# Patient Record
Sex: FEMALE | Race: WHITE | Employment: UNEMPLOYED | URBAN - NONMETROPOLITAN AREA
[De-identification: names, ages, dates, MRNs, and addresses within clinical notes are randomized per-mention and may not be internally consistent; named-entity substitution may affect disease eponyms.]

---

## 2017-08-19 ENCOUNTER — APPOINTMENT (OUTPATIENT)
Dept: GENERAL RADIOLOGY | Age: 52
End: 2017-08-19
Payer: MEDICARE

## 2017-08-19 ENCOUNTER — HOSPITAL ENCOUNTER (EMERGENCY)
Age: 52
Discharge: HOME | End: 2017-08-19
Payer: MEDICARE

## 2017-08-19 VITALS
TEMPERATURE: 97.8 F | RESPIRATION RATE: 20 BRPM | BODY MASS INDEX: 16.83 KG/M2 | DIASTOLIC BLOOD PRESSURE: 94 MMHG | HEART RATE: 83 BPM | WEIGHT: 95 LBS | OXYGEN SATURATION: 100 % | HEIGHT: 63 IN | SYSTOLIC BLOOD PRESSURE: 116 MMHG

## 2017-08-19 DIAGNOSIS — M25.511 ACUTE PAIN OF RIGHT SHOULDER: ICD-10-CM

## 2017-08-19 DIAGNOSIS — T14.8XXA CONTUSION OF CLAVICLE, RIGHT, INITIAL ENCOUNTER: Primary | ICD-10-CM

## 2017-08-19 DIAGNOSIS — S40.211A SHOULDER ABRASION, RIGHT, INITIAL ENCOUNTER: ICD-10-CM

## 2017-08-19 PROCEDURE — 99283 EMERGENCY DEPT VISIT LOW MDM: CPT

## 2017-08-19 PROCEDURE — A4565 SLINGS: HCPCS

## 2017-08-19 PROCEDURE — 73000 X-RAY EXAM OF COLLAR BONE: CPT

## 2017-08-19 PROCEDURE — 6370000000 HC RX 637 (ALT 250 FOR IP): Performed by: PHYSICIAN ASSISTANT

## 2017-08-19 RX ORDER — HYDROCODONE BITARTRATE AND ACETAMINOPHEN 5; 325 MG/1; MG/1
1 TABLET ORAL ONCE
Status: COMPLETED | OUTPATIENT
Start: 2017-08-19 | End: 2017-08-19

## 2017-08-19 RX ORDER — NAPROXEN 500 MG/1
500 TABLET ORAL 2 TIMES DAILY
Qty: 60 TABLET | Refills: 0 | Status: SHIPPED | OUTPATIENT
Start: 2017-08-19 | End: 2019-06-26 | Stop reason: ALTCHOICE

## 2017-08-19 RX ORDER — HYDROCODONE BITARTRATE AND ACETAMINOPHEN 5; 325 MG/1; MG/1
1 TABLET ORAL EVERY 6 HOURS PRN
Qty: 12 TABLET | Refills: 0 | Status: SHIPPED | OUTPATIENT
Start: 2017-08-19 | End: 2019-06-26 | Stop reason: ALTCHOICE

## 2017-08-19 RX ADMIN — HYDROCODONE BITARTRATE AND ACETAMINOPHEN 1 TABLET: 5; 325 TABLET ORAL at 11:32

## 2017-08-19 ASSESSMENT — ENCOUNTER SYMPTOMS
RHINORRHEA: 0
NAUSEA: 0
WHEEZING: 0
ABDOMINAL PAIN: 0
SORE THROAT: 0
COUGH: 0
EYE PAIN: 0
BACK PAIN: 0
SHORTNESS OF BREATH: 0
DIARRHEA: 0
EYE DISCHARGE: 0
VOMITING: 0

## 2017-08-19 ASSESSMENT — PAIN SCALES - GENERAL
PAINLEVEL_OUTOF10: 10
PAINLEVEL_OUTOF10: 10

## 2017-08-19 ASSESSMENT — PAIN DESCRIPTION - PROGRESSION: CLINICAL_PROGRESSION: GRADUALLY WORSENING

## 2017-08-19 ASSESSMENT — PAIN DESCRIPTION - PAIN TYPE: TYPE: ACUTE PAIN

## 2017-08-19 ASSESSMENT — PAIN DESCRIPTION - ORIENTATION: ORIENTATION: RIGHT

## 2017-08-19 ASSESSMENT — PAIN DESCRIPTION - LOCATION: LOCATION: SHOULDER

## 2017-08-19 ASSESSMENT — PAIN DESCRIPTION - DESCRIPTORS: DESCRIPTORS: ACHING;SHARP

## 2018-10-29 ENCOUNTER — HOSPITAL ENCOUNTER (OUTPATIENT)
Age: 53
Discharge: HOME | End: 2018-10-29
Payer: MEDICARE

## 2018-10-29 ENCOUNTER — HOSPITAL ENCOUNTER (OUTPATIENT)
Dept: GENERAL RADIOLOGY | Age: 53
Discharge: HOME | End: 2018-10-29
Payer: MEDICARE

## 2018-10-29 DIAGNOSIS — J20.9 ACUTE BRONCHITIS, UNSPECIFIED ORGANISM: ICD-10-CM

## 2018-10-29 PROCEDURE — 71046 X-RAY EXAM CHEST 2 VIEWS: CPT

## 2019-06-05 ENCOUNTER — HOSPITAL ENCOUNTER (OUTPATIENT)
Dept: MRI IMAGING | Age: 54
Discharge: HOME | End: 2019-06-05
Payer: MEDICARE

## 2019-06-05 DIAGNOSIS — M54.16 LUMBAR RADICULOPATHY: ICD-10-CM

## 2019-06-05 PROCEDURE — 72148 MRI LUMBAR SPINE W/O DYE: CPT

## 2019-06-17 ENCOUNTER — HOSPITAL ENCOUNTER (OUTPATIENT)
Dept: INTERVENTIONAL RADIOLOGY/VASCULAR | Age: 54
Discharge: HOME | End: 2019-06-17
Payer: MEDICARE

## 2019-06-17 DIAGNOSIS — I73.9 PVD (PERIPHERAL VASCULAR DISEASE) (HCC): ICD-10-CM

## 2019-06-17 PROCEDURE — 93923 UPR/LXTR ART STDY 3+ LVLS: CPT

## 2019-06-26 RX ORDER — ACETAMINOPHEN,DIPHENHYDRAMINE HCL 500; 25 MG/1; MG/1
2 TABLET, FILM COATED ORAL NIGHTLY PRN
COMMUNITY

## 2019-06-26 NOTE — PROGRESS NOTES
NPO after midnight  [employer] and drivers license  Wear comfortable clean clothing  Do not bring jewelry  Shower night before and morning of surgery with a liquid antibacterial soap  Bring list of medications with dosage and how often taken  Follow all instructions given by your physician   needed at discharge  Call [phone] for any questions    In preparation for their surgical procedure above patient was screened for Obstructive Sleep Apnea (ROHAN) using the STOP-Bang Questionnaire by the Pre-Admission Testing department. This is a pre-surgical screening tool for patient safety and serves as a recommendation, this WILL NOT cause cancellation of surgery. STOP-Bang Questionnaire  * Do you currently see a pulmonologist?  No     If yes STOP, do not complete. Patient follows with  .    1. Do you snore loudly (able to be heard in the next room)? No    2. Do you often feel tired or sleepy during the daytime? No       3. Has anyone ever told you that you stop breathing during your sleep? No    4. Do you have or are you being treated for high blood pressure? No      5. BMI more than 35? BMI (Calculated): 16        No    6. Age over 48 years? 46 y.o. Yes    7. Neck Circumference greater than 17 inches for male or 16 inches for female? Measured           (visits only)            Not Applicable    8. Gender Male? No      TOTAL SCORE: 1    ROHAN - Low Risk : Yes to 0 - 2 questions  ROHAN - Intermediate Risk : Yes to 3 - 4 questions  ROHAN - High Risk : Yes to 5 - 8 questions    Adapted from:   STOP Questionnaire: A Tool to Screen Patients for Obstructive Sleep Apnea   GATITO Martinez.P.C., MARY ANNE Hadley.B.B.S., Stephanie Padilla M.D., Kaitlyn Main. Gaurav Frank, Ph.D., MARY ANNE Hanley.B.B.S., MARY ANNE Nettles.Sc., Jessica Saucedo M.D., Juana Gonzalez. GATITO Hope.P.C.    Anesthesiology 2008; 993:501-29 Copyright 2008, the [address] of

## 2019-07-01 NOTE — H&P
Radiculopathy, lumbar region. 2.  Spinal stenosis, lumbar region. 3.  Intervertebral disk degeneration, lumbar region. PLAN:  Plan moving forward with the patient is for her to undergo a  lumbar laminectomy and posterior spinal fusion of L3 to L5 on the date  of 07/03/2019 at [address] [address]. CONCLUSION:  In conclusion, the patient is a 48-year-old woman who  presented to the office with significant right lower extremity  radiculopathy and lumbar pain. She has tried various outpatient  conservative therapies without much relief of her symptoms. She is no  longer able to complete her daily activities and especially with her  comfort level. She is electing to undergo operative management and has  been cleared by Dr. Teagan Ryan to do so. We have received informed consent  from the patient. We have gone over the risks and benefits of surgery  which include postoperative pain, blood loss requiring transfusion,  spinal fluid leak, nerve root injury, wound infection, and difficulty  with anesthesia. We have answered the patient's questions and concerns  to her satisfaction. Absolutely, no guarantees have been made for as  far as the results of the surgery.         Mart Bautista PA-C    D: 06/30/2019 15:16:56       T: 06/30/2019 21:58:39     [email or web address]  Job#: [id number]     Doc#: [id number]    CC:

## 2019-07-03 ENCOUNTER — HOSPITAL ENCOUNTER (INPATIENT)
Age: 54
LOS: 4 days | Discharge: HOME | DRG: 460 | End: 2019-07-07
Attending: ORTHOPAEDIC SURGERY | Admitting: ORTHOPAEDIC SURGERY
Payer: MEDICARE

## 2019-07-03 ENCOUNTER — ANESTHESIA EVENT (OUTPATIENT)
Dept: OPERATING ROOM | Age: 54
DRG: 460 | End: 2019-07-03
Payer: MEDICARE

## 2019-07-03 ENCOUNTER — APPOINTMENT (OUTPATIENT)
Dept: GENERAL RADIOLOGY | Age: 54
DRG: 460 | End: 2019-07-03
Attending: ORTHOPAEDIC SURGERY
Payer: MEDICARE

## 2019-07-03 ENCOUNTER — ANESTHESIA (OUTPATIENT)
Dept: OPERATING ROOM | Age: 54
DRG: 460 | End: 2019-07-03
Payer: MEDICARE

## 2019-07-03 VITALS
TEMPERATURE: 97.5 F | DIASTOLIC BLOOD PRESSURE: 63 MMHG | RESPIRATION RATE: 13 BRPM | SYSTOLIC BLOOD PRESSURE: 106 MMHG | OXYGEN SATURATION: 96 %

## 2019-07-03 DIAGNOSIS — Z98.1 S/P LUMBAR FUSION: Primary | ICD-10-CM

## 2019-07-03 PROBLEM — M48.00 SPINAL STENOSIS: Status: ACTIVE | Noted: 2019-07-03

## 2019-07-03 LAB
ABO CHECK: NORMAL
ANTIBODY SCREEN: NORMAL
RH FACTOR: NORMAL

## 2019-07-03 PROCEDURE — 86922 COMPATIBILITY TEST ANTIGLOB: CPT

## 2019-07-03 PROCEDURE — 36415 COLL VENOUS BLD VENIPUNCTURE: CPT

## 2019-07-03 PROCEDURE — 6360000002 HC RX W HCPCS: Performed by: PHYSICIAN ASSISTANT

## 2019-07-03 PROCEDURE — 3600000015 HC SURGERY LEVEL 5 ADDTL 15MIN: Performed by: ORTHOPAEDIC SURGERY

## 2019-07-03 PROCEDURE — 36430 TRANSFUSION BLD/BLD COMPNT: CPT

## 2019-07-03 PROCEDURE — 97162 PT EVAL MOD COMPLEX 30 MIN: CPT

## 2019-07-03 PROCEDURE — C1713 ANCHOR/SCREW BN/BN,TIS/BN: HCPCS | Performed by: ORTHOPAEDIC SURGERY

## 2019-07-03 PROCEDURE — 7100000001 HC PACU RECOVERY - ADDTL 15 MIN: Performed by: ORTHOPAEDIC SURGERY

## 2019-07-03 PROCEDURE — 2580000003 HC RX 258: Performed by: ORTHOPAEDIC SURGERY

## 2019-07-03 PROCEDURE — 6360000002 HC RX W HCPCS

## 2019-07-03 PROCEDURE — 2720000010 HC SURG SUPPLY STERILE: Performed by: ORTHOPAEDIC SURGERY

## 2019-07-03 PROCEDURE — P9045 ALBUMIN (HUMAN), 5%, 250 ML: HCPCS

## 2019-07-03 PROCEDURE — 3700000000 HC ANESTHESIA ATTENDED CARE: Performed by: ORTHOPAEDIC SURGERY

## 2019-07-03 PROCEDURE — 2500000003 HC RX 250 WO HCPCS: Performed by: ORTHOPAEDIC SURGERY

## 2019-07-03 PROCEDURE — 86850 RBC ANTIBODY SCREEN: CPT

## 2019-07-03 PROCEDURE — 2500000003 HC RX 250 WO HCPCS

## 2019-07-03 PROCEDURE — 3700000001 HC ADD 15 MINUTES (ANESTHESIA): Performed by: ORTHOPAEDIC SURGERY

## 2019-07-03 PROCEDURE — 2580000003 HC RX 258

## 2019-07-03 PROCEDURE — 0SG10AJ FUSION OF 2 OR MORE LUMBAR VERTEBRAL JOINTS WITH INTERBODY FUSION DEVICE, POSTERIOR APPROACH, ANTERIOR COLUMN, OPEN APPROACH: ICD-10-PCS | Performed by: ORTHOPAEDIC SURGERY

## 2019-07-03 PROCEDURE — 7100000000 HC PACU RECOVERY - FIRST 15 MIN: Performed by: ORTHOPAEDIC SURGERY

## 2019-07-03 PROCEDURE — 6370000000 HC RX 637 (ALT 250 FOR IP): Performed by: PHYSICIAN ASSISTANT

## 2019-07-03 PROCEDURE — P9016 RBC LEUKOCYTES REDUCED: HCPCS

## 2019-07-03 PROCEDURE — 97110 THERAPEUTIC EXERCISES: CPT

## 2019-07-03 PROCEDURE — 72100 X-RAY EXAM L-S SPINE 2/3 VWS: CPT

## 2019-07-03 PROCEDURE — 3600000005 HC SURGERY LEVEL 5 BASE: Performed by: ORTHOPAEDIC SURGERY

## 2019-07-03 PROCEDURE — 2709999900 HC NON-CHARGEABLE SUPPLY: Performed by: ORTHOPAEDIC SURGERY

## 2019-07-03 PROCEDURE — 86901 BLOOD TYPING SEROLOGIC RH(D): CPT

## 2019-07-03 PROCEDURE — 1200000000 HC SEMI PRIVATE

## 2019-07-03 PROCEDURE — 72020 X-RAY EXAM OF SPINE 1 VIEW: CPT

## 2019-07-03 PROCEDURE — 86900 BLOOD TYPING SEROLOGIC ABO: CPT

## 2019-07-03 PROCEDURE — 6370000000 HC RX 637 (ALT 250 FOR IP): Performed by: ORTHOPAEDIC SURGERY

## 2019-07-03 DEVICE — POLYAXIAL SCREW, 6.5 X 45
Type: IMPLANTABLE DEVICE | Site: SPINE LUMBAR | Status: FUNCTIONAL
Brand: POLYAXIAL SCREW, 6.5 X 45

## 2019-07-03 DEVICE — IMPLANTABLE DEVICE
Type: IMPLANTABLE DEVICE | Site: SPINE LUMBAR | Status: FUNCTIONAL
Brand: SET SCREW

## 2019-07-03 DEVICE — DBM T42200 2.5CMX10CM 2 EACH GRAFTON MAT
Type: IMPLANTABLE DEVICE | Site: SPINE LUMBAR | Status: FUNCTIONAL
Brand: GRAFTON®AND GRAFTON PLUS®DEMINERALIZED BONE MATRIX (DBM)

## 2019-07-03 DEVICE — CURVED ROD, 5.5 X 80
Type: IMPLANTABLE DEVICE | Site: SPINE LUMBAR | Status: FUNCTIONAL
Brand: CURVED ROD, 5.5 X 80

## 2019-07-03 RX ORDER — HYDRALAZINE HYDROCHLORIDE 20 MG/ML
5 INJECTION INTRAMUSCULAR; INTRAVENOUS EVERY 10 MIN PRN
Status: DISCONTINUED | OUTPATIENT
Start: 2019-07-03 | End: 2019-07-03 | Stop reason: HOSPADM

## 2019-07-03 RX ORDER — LIDOCAINE HYDROCHLORIDE 20 MG/ML
INJECTION, SOLUTION INFILTRATION; PERINEURAL PRN
Status: DISCONTINUED | OUTPATIENT
Start: 2019-07-03 | End: 2019-07-03 | Stop reason: SDUPTHER

## 2019-07-03 RX ORDER — SODIUM CHLORIDE 9 MG/ML
INJECTION, SOLUTION INTRAVENOUS CONTINUOUS
Status: DISCONTINUED | OUTPATIENT
Start: 2019-07-03 | End: 2019-07-07 | Stop reason: HOSPADM

## 2019-07-03 RX ORDER — DIPHENHYDRAMINE HCL 25 MG
50 TABLET ORAL NIGHTLY PRN
Status: DISCONTINUED | OUTPATIENT
Start: 2019-07-03 | End: 2019-07-07 | Stop reason: HOSPADM

## 2019-07-03 RX ORDER — ACETAMINOPHEN,DIPHENHYDRAMINE HCL 500; 25 MG/1; MG/1
2 TABLET, FILM COATED ORAL NIGHTLY PRN
Status: DISCONTINUED | OUTPATIENT
Start: 2019-07-03 | End: 2019-07-03 | Stop reason: CLARIF

## 2019-07-03 RX ORDER — POLYETHYLENE GLYCOL 3350 17 G/17G
17 POWDER, FOR SOLUTION ORAL DAILY
Status: DISCONTINUED | OUTPATIENT
Start: 2019-07-03 | End: 2019-07-07 | Stop reason: HOSPADM

## 2019-07-03 RX ORDER — OXYCODONE HYDROCHLORIDE AND ACETAMINOPHEN 5; 325 MG/1; MG/1
2 TABLET ORAL EVERY 4 HOURS PRN
Status: DISCONTINUED | OUTPATIENT
Start: 2019-07-03 | End: 2019-07-07 | Stop reason: HOSPADM

## 2019-07-03 RX ORDER — PROPOFOL 10 MG/ML
INJECTION, EMULSION INTRAVENOUS PRN
Status: DISCONTINUED | OUTPATIENT
Start: 2019-07-03 | End: 2019-07-03 | Stop reason: SDUPTHER

## 2019-07-03 RX ORDER — ONDANSETRON 2 MG/ML
4 INJECTION INTRAMUSCULAR; INTRAVENOUS
Status: DISCONTINUED | OUTPATIENT
Start: 2019-07-03 | End: 2019-07-03 | Stop reason: HOSPADM

## 2019-07-03 RX ORDER — DEXAMETHASONE SODIUM PHOSPHATE 4 MG/ML
INJECTION, SOLUTION INTRA-ARTICULAR; INTRALESIONAL; INTRAMUSCULAR; INTRAVENOUS; SOFT TISSUE PRN
Status: DISCONTINUED | OUTPATIENT
Start: 2019-07-03 | End: 2019-07-03 | Stop reason: SDUPTHER

## 2019-07-03 RX ORDER — SODIUM CHLORIDE 0.9 % (FLUSH) 0.9 %
10 SYRINGE (ML) INJECTION EVERY 12 HOURS SCHEDULED
Status: DISCONTINUED | OUTPATIENT
Start: 2019-07-03 | End: 2019-07-03 | Stop reason: HOSPADM

## 2019-07-03 RX ORDER — FENTANYL CITRATE 50 UG/ML
50 INJECTION, SOLUTION INTRAMUSCULAR; INTRAVENOUS EVERY 5 MIN PRN
Status: DISCONTINUED | OUTPATIENT
Start: 2019-07-03 | End: 2019-07-03 | Stop reason: HOSPADM

## 2019-07-03 RX ORDER — FENTANYL CITRATE 50 UG/ML
INJECTION, SOLUTION INTRAMUSCULAR; INTRAVENOUS PRN
Status: DISCONTINUED | OUTPATIENT
Start: 2019-07-03 | End: 2019-07-03 | Stop reason: SDUPTHER

## 2019-07-03 RX ORDER — SODIUM CHLORIDE 0.9 % (FLUSH) 0.9 %
10 SYRINGE (ML) INJECTION EVERY 12 HOURS SCHEDULED
Status: DISCONTINUED | OUTPATIENT
Start: 2019-07-03 | End: 2019-07-07 | Stop reason: HOSPADM

## 2019-07-03 RX ORDER — SODIUM CHLORIDE 0.9 % (FLUSH) 0.9 %
10 SYRINGE (ML) INJECTION PRN
Status: DISCONTINUED | OUTPATIENT
Start: 2019-07-03 | End: 2019-07-03 | Stop reason: HOSPADM

## 2019-07-03 RX ORDER — ROCURONIUM BROMIDE 10 MG/ML
INJECTION, SOLUTION INTRAVENOUS PRN
Status: DISCONTINUED | OUTPATIENT
Start: 2019-07-03 | End: 2019-07-03 | Stop reason: SDUPTHER

## 2019-07-03 RX ORDER — ACETAMINOPHEN AND CODEINE PHOSPHATE 300; 30 MG/1; MG/1
1 TABLET ORAL EVERY 4 HOURS PRN
Status: ON HOLD | COMMUNITY
End: 2019-07-07 | Stop reason: HOSPADM

## 2019-07-03 RX ORDER — DOCUSATE SODIUM 100 MG/1
100 CAPSULE, LIQUID FILLED ORAL 2 TIMES DAILY
Status: DISCONTINUED | OUTPATIENT
Start: 2019-07-03 | End: 2019-07-07 | Stop reason: HOSPADM

## 2019-07-03 RX ORDER — SODIUM CHLORIDE, SODIUM LACTATE, POTASSIUM CHLORIDE, CALCIUM CHLORIDE 600; 310; 30; 20 MG/100ML; MG/100ML; MG/100ML; MG/100ML
INJECTION, SOLUTION INTRAVENOUS CONTINUOUS PRN
Status: DISCONTINUED | OUTPATIENT
Start: 2019-07-03 | End: 2019-07-03 | Stop reason: SDUPTHER

## 2019-07-03 RX ORDER — SODIUM CHLORIDE 0.9 % (FLUSH) 0.9 %
10 SYRINGE (ML) INJECTION PRN
Status: DISCONTINUED | OUTPATIENT
Start: 2019-07-03 | End: 2019-07-07 | Stop reason: HOSPADM

## 2019-07-03 RX ORDER — BISACODYL 10 MG
10 SUPPOSITORY, RECTAL RECTAL DAILY PRN
Status: DISCONTINUED | OUTPATIENT
Start: 2019-07-03 | End: 2019-07-05

## 2019-07-03 RX ORDER — EPHEDRINE SULFATE/0.9% NACL/PF 50 MG/5 ML
SYRINGE (ML) INTRAVENOUS PRN
Status: DISCONTINUED | OUTPATIENT
Start: 2019-07-03 | End: 2019-07-03 | Stop reason: SDUPTHER

## 2019-07-03 RX ORDER — MIDAZOLAM HYDROCHLORIDE 1 MG/ML
INJECTION INTRAMUSCULAR; INTRAVENOUS PRN
Status: DISCONTINUED | OUTPATIENT
Start: 2019-07-03 | End: 2019-07-03 | Stop reason: SDUPTHER

## 2019-07-03 RX ORDER — MEPERIDINE HYDROCHLORIDE 25 MG/ML
12.5 INJECTION INTRAMUSCULAR; INTRAVENOUS; SUBCUTANEOUS EVERY 5 MIN PRN
Status: DISCONTINUED | OUTPATIENT
Start: 2019-07-03 | End: 2019-07-03 | Stop reason: HOSPADM

## 2019-07-03 RX ORDER — ONDANSETRON 2 MG/ML
4 INJECTION INTRAMUSCULAR; INTRAVENOUS EVERY 6 HOURS PRN
Status: DISCONTINUED | OUTPATIENT
Start: 2019-07-03 | End: 2019-07-07 | Stop reason: HOSPADM

## 2019-07-03 RX ORDER — CYCLOBENZAPRINE HCL 10 MG
10 TABLET ORAL 3 TIMES DAILY PRN
Status: DISCONTINUED | OUTPATIENT
Start: 2019-07-03 | End: 2019-07-07 | Stop reason: HOSPADM

## 2019-07-03 RX ORDER — SODIUM CHLORIDE 9 MG/ML
INJECTION, SOLUTION INTRAVENOUS CONTINUOUS
Status: DISCONTINUED | OUTPATIENT
Start: 2019-07-03 | End: 2019-07-03

## 2019-07-03 RX ORDER — GLYCOPYRROLATE 1 MG/5 ML
SYRINGE (ML) INTRAVENOUS PRN
Status: DISCONTINUED | OUTPATIENT
Start: 2019-07-03 | End: 2019-07-03 | Stop reason: SDUPTHER

## 2019-07-03 RX ORDER — LIDOCAINE HYDROCHLORIDE AND EPINEPHRINE 10; 10 MG/ML; UG/ML
INJECTION, SOLUTION INFILTRATION; PERINEURAL PRN
Status: DISCONTINUED | OUTPATIENT
Start: 2019-07-03 | End: 2019-07-03 | Stop reason: HOSPADM

## 2019-07-03 RX ORDER — SODIUM CHLORIDE 9 MG/ML
INJECTION, SOLUTION INTRAVENOUS CONTINUOUS PRN
Status: DISCONTINUED | OUTPATIENT
Start: 2019-07-03 | End: 2019-07-03 | Stop reason: SDUPTHER

## 2019-07-03 RX ORDER — ACETAMINOPHEN 500 MG
1000 TABLET ORAL NIGHTLY PRN
Status: DISCONTINUED | OUTPATIENT
Start: 2019-07-03 | End: 2019-07-07 | Stop reason: HOSPADM

## 2019-07-03 RX ORDER — LOPERAMIDE HYDROCHLORIDE AND SIMETHICONE 2; 125 MG/1; MG/1
2 TABLET ORAL DAILY PRN
COMMUNITY

## 2019-07-03 RX ORDER — NEOSTIGMINE METHYLSULFATE 5 MG/5 ML
SYRINGE (ML) INTRAVENOUS PRN
Status: DISCONTINUED | OUTPATIENT
Start: 2019-07-03 | End: 2019-07-03 | Stop reason: SDUPTHER

## 2019-07-03 RX ORDER — KETOROLAC TROMETHAMINE 30 MG/ML
INJECTION, SOLUTION INTRAMUSCULAR; INTRAVENOUS PRN
Status: DISCONTINUED | OUTPATIENT
Start: 2019-07-03 | End: 2019-07-03 | Stop reason: SDUPTHER

## 2019-07-03 RX ORDER — ONDANSETRON 2 MG/ML
INJECTION INTRAMUSCULAR; INTRAVENOUS PRN
Status: DISCONTINUED | OUTPATIENT
Start: 2019-07-03 | End: 2019-07-03 | Stop reason: SDUPTHER

## 2019-07-03 RX ORDER — ALBUMIN, HUMAN INJ 5% 5 %
SOLUTION INTRAVENOUS PRN
Status: DISCONTINUED | OUTPATIENT
Start: 2019-07-03 | End: 2019-07-03 | Stop reason: SDUPTHER

## 2019-07-03 RX ORDER — OXYCODONE HYDROCHLORIDE AND ACETAMINOPHEN 5; 325 MG/1; MG/1
1 TABLET ORAL EVERY 4 HOURS PRN
Status: DISCONTINUED | OUTPATIENT
Start: 2019-07-03 | End: 2019-07-07 | Stop reason: HOSPADM

## 2019-07-03 RX ADMIN — FENTANYL CITRATE 50 MCG: 50 INJECTION INTRAMUSCULAR; INTRAVENOUS at 09:49

## 2019-07-03 RX ADMIN — FENTANYL CITRATE 100 MCG: 50 INJECTION INTRAMUSCULAR; INTRAVENOUS at 09:15

## 2019-07-03 RX ADMIN — CYCLOBENZAPRINE HYDROCHLORIDE 10 MG: 10 TABLET, FILM COATED ORAL at 18:05

## 2019-07-03 RX ADMIN — ALBUMIN (HUMAN) 500 ML: 12.5 SOLUTION INTRAVENOUS at 09:02

## 2019-07-03 RX ADMIN — KETOROLAC TROMETHAMINE 30 MG: 30 INJECTION, SOLUTION INTRAMUSCULAR; INTRAVENOUS at 10:53

## 2019-07-03 RX ADMIN — Medication 2 G: at 17:02

## 2019-07-03 RX ADMIN — Medication 25 MG: at 10:04

## 2019-07-03 RX ADMIN — SODIUM CHLORIDE, POTASSIUM CHLORIDE, SODIUM LACTATE AND CALCIUM CHLORIDE: 600; 310; 30; 20 INJECTION, SOLUTION INTRAVENOUS at 10:00

## 2019-07-03 RX ADMIN — Medication 0.8 MG: at 10:53

## 2019-07-03 RX ADMIN — DOCUSATE SODIUM 100 MG: 100 CAPSULE, LIQUID FILLED ORAL at 20:23

## 2019-07-03 RX ADMIN — SODIUM CHLORIDE, POTASSIUM CHLORIDE, SODIUM LACTATE AND CALCIUM CHLORIDE: 600; 310; 30; 20 INJECTION, SOLUTION INTRAVENOUS at 08:50

## 2019-07-03 RX ADMIN — POLYETHYLENE GLYCOL 3350 17 G: 17 POWDER, FOR SOLUTION ORAL at 20:23

## 2019-07-03 RX ADMIN — SODIUM CHLORIDE: 9 INJECTION, SOLUTION INTRAVENOUS at 08:42

## 2019-07-03 RX ADMIN — Medication 5 MG: at 10:53

## 2019-07-03 RX ADMIN — SODIUM CHLORIDE: 9 INJECTION, SOLUTION INTRAVENOUS at 07:27

## 2019-07-03 RX ADMIN — ONDANSETRON HYDROCHLORIDE 4 MG: 4 INJECTION, SOLUTION INTRAMUSCULAR; INTRAVENOUS at 10:53

## 2019-07-03 RX ADMIN — FENTANYL CITRATE 100 MCG: 50 INJECTION INTRAMUSCULAR; INTRAVENOUS at 08:46

## 2019-07-03 RX ADMIN — DEXAMETHASONE SODIUM PHOSPHATE 8 MG: 4 INJECTION, SOLUTION INTRAMUSCULAR; INTRAVENOUS at 10:53

## 2019-07-03 RX ADMIN — OXYCODONE HYDROCHLORIDE AND ACETAMINOPHEN 1 TABLET: 5; 325 TABLET ORAL at 14:04

## 2019-07-03 RX ADMIN — Medication 25 MG: at 09:44

## 2019-07-03 RX ADMIN — PROPOFOL 120 MG: 10 INJECTION, EMULSION INTRAVENOUS at 08:47

## 2019-07-03 RX ADMIN — Medication 2 G: at 08:49

## 2019-07-03 RX ADMIN — LIDOCAINE HYDROCHLORIDE 60 MG: 20 INJECTION, SOLUTION INFILTRATION; PERINEURAL at 08:46

## 2019-07-03 RX ADMIN — MIDAZOLAM HYDROCHLORIDE 2 MG: 1 INJECTION, SOLUTION INTRAMUSCULAR; INTRAVENOUS at 08:45

## 2019-07-03 RX ADMIN — SODIUM CHLORIDE, POTASSIUM CHLORIDE, SODIUM LACTATE AND CALCIUM CHLORIDE: 600; 310; 30; 20 INJECTION, SOLUTION INTRAVENOUS at 08:30

## 2019-07-03 RX ADMIN — ROCURONIUM BROMIDE 50 MG: 10 INJECTION INTRAVENOUS at 08:47

## 2019-07-03 RX ADMIN — OXYCODONE HYDROCHLORIDE AND ACETAMINOPHEN 2 TABLET: 5; 325 TABLET ORAL at 20:23

## 2019-07-03 ASSESSMENT — PULMONARY FUNCTION TESTS
PIF_VALUE: 15
PIF_VALUE: 10
PIF_VALUE: 14
PIF_VALUE: 15
PIF_VALUE: 16
PIF_VALUE: 15
PIF_VALUE: 14
PIF_VALUE: 15
PIF_VALUE: 16
PIF_VALUE: 15
PIF_VALUE: 15
PIF_VALUE: 14
PIF_VALUE: 15
PIF_VALUE: 14
PIF_VALUE: 15
PIF_VALUE: 15
PIF_VALUE: 11
PIF_VALUE: 14
PIF_VALUE: 16
PIF_VALUE: 15
PIF_VALUE: 14
PIF_VALUE: 15
PIF_VALUE: 16
PIF_VALUE: 16
PIF_VALUE: 14
PIF_VALUE: 15
PIF_VALUE: 14
PIF_VALUE: 15
PIF_VALUE: 16
PIF_VALUE: 15
PIF_VALUE: 15
PIF_VALUE: 16
PIF_VALUE: 15
PIF_VALUE: 16
PIF_VALUE: 14
PIF_VALUE: 15
PIF_VALUE: 12
PIF_VALUE: 15
PIF_VALUE: 14
PIF_VALUE: 14
PIF_VALUE: 11
PIF_VALUE: 12
PIF_VALUE: 15
PIF_VALUE: 15
PIF_VALUE: 1
PIF_VALUE: 16
PIF_VALUE: 15
PIF_VALUE: 16
PIF_VALUE: 15
PIF_VALUE: 14
PIF_VALUE: 15
PIF_VALUE: 16
PIF_VALUE: 16
PIF_VALUE: 14
PIF_VALUE: 15
PIF_VALUE: 15
PIF_VALUE: 2
PIF_VALUE: 16
PIF_VALUE: 15
PIF_VALUE: 14
PIF_VALUE: 16
PIF_VALUE: 26
PIF_VALUE: 15
PIF_VALUE: 14
PIF_VALUE: 14
PIF_VALUE: 16
PIF_VALUE: 15
PIF_VALUE: 15
PIF_VALUE: 16
PIF_VALUE: 15
PIF_VALUE: 14
PIF_VALUE: 15
PIF_VALUE: 14
PIF_VALUE: 15
PIF_VALUE: 16
PIF_VALUE: 2
PIF_VALUE: 15
PIF_VALUE: 16
PIF_VALUE: 14
PIF_VALUE: 16
PIF_VALUE: 12
PIF_VALUE: 15
PIF_VALUE: 14
PIF_VALUE: 14
PIF_VALUE: 12
PIF_VALUE: 14
PIF_VALUE: 15
PIF_VALUE: 15
PIF_VALUE: 16
PIF_VALUE: 14
PIF_VALUE: 15
PIF_VALUE: 15
PIF_VALUE: 14
PIF_VALUE: 16
PIF_VALUE: 15
PIF_VALUE: 16
PIF_VALUE: 14
PIF_VALUE: 15
PIF_VALUE: 15
PIF_VALUE: 1
PIF_VALUE: 14
PIF_VALUE: 15
PIF_VALUE: 12
PIF_VALUE: 16
PIF_VALUE: 15
PIF_VALUE: 16
PIF_VALUE: 15
PIF_VALUE: 12
PIF_VALUE: 15
PIF_VALUE: 17
PIF_VALUE: 17
PIF_VALUE: 16
PIF_VALUE: 15
PIF_VALUE: 16
PIF_VALUE: 15
PIF_VALUE: 17
PIF_VALUE: 15
PIF_VALUE: 15

## 2019-07-03 ASSESSMENT — PAIN DESCRIPTION - PAIN TYPE
TYPE: SURGICAL PAIN

## 2019-07-03 ASSESSMENT — PAIN DESCRIPTION - LOCATION
LOCATION: BACK

## 2019-07-03 ASSESSMENT — PAIN SCALES - GENERAL
PAINLEVEL_OUTOF10: 9
PAINLEVEL_OUTOF10: 9
PAINLEVEL_OUTOF10: 10
PAINLEVEL_OUTOF10: 8
PAINLEVEL_OUTOF10: 8

## 2019-07-03 ASSESSMENT — PAIN DESCRIPTION - ONSET: ONSET: GRADUAL

## 2019-07-03 ASSESSMENT — PAIN DESCRIPTION - DESCRIPTORS: DESCRIPTORS: CONSTANT;BURNING

## 2019-07-03 ASSESSMENT — PAIN DESCRIPTION - ORIENTATION
ORIENTATION: MID
ORIENTATION: MID

## 2019-07-03 ASSESSMENT — PAIN - FUNCTIONAL ASSESSMENT
PAIN_FUNCTIONAL_ASSESSMENT: 0-10
PAIN_FUNCTIONAL_ASSESSMENT: PREVENTS OR INTERFERES SOME ACTIVE ACTIVITIES AND ADLS

## 2019-07-03 ASSESSMENT — PAIN DESCRIPTION - PROGRESSION: CLINICAL_PROGRESSION: NOT CHANGED

## 2019-07-03 ASSESSMENT — PAIN DESCRIPTION - FREQUENCY: FREQUENCY: CONTINUOUS

## 2019-07-03 NOTE — ANESTHESIA PRE PROCEDURE
05/18/2014    ALT 15 05/18/2014       POC Tests: No results for input(s): POCGLU, POCNA, POCK, POCCL, POCBUN, POCHEMO, POCHCT in the last 72 hours. Coags: No results found for: PROTIME, INR, APTT    HCG (If Applicable): No results found for: PREGTESTUR, PREGSERUM, HCG, HCGQUANT     ABGs: No results found for: PHART, PO2ART, PSF7IOU, OKW2VOP, BEART, H5ZXUSII     Type & Screen (If Applicable):  Lab Results   Component Value Date    [address] POS 05/14/2014       Anesthesia Evaluation  Patient summary reviewed and Nursing notes reviewed no history of anesthetic complications:   Airway: Mallampati: II  TM distance: >3 FB   Neck ROM: full  Mouth opening: > = 3 FB Dental:    (+) upper dentures and lower dentures      Pulmonary:normal exam  breath sounds clear to auscultation                            ROS comment: Former smoker   Cardiovascular:Negative CV ROS  Exercise tolerance: good (>4 METS),         ECG reviewed                        Neuro/Psych:   Negative Neuro/Psych ROS              GI/Hepatic/Renal: Neg GI/Hepatic/Renal ROS            Endo/Other:    (+) malignancy/cancer (h/o cervical cancer). Pt had no PAT visit       Abdominal:           Vascular: negative vascular ROS. Anesthesia Plan      general     ASA 3       Induction: intravenous. MIPS: Postoperative opioids intended and Prophylactic antiemetics administered. Anesthetic plan and risks discussed with patient and spouse. Plan discussed with CRNA.                   [address] Joseph Medical CenterNexercise Moe, DO   7/3/2019

## 2019-07-03 NOTE — PROGRESS NOTES
[address] [address]  INPATIENT PHYSICAL THERAPY  EVALUATION  [facility] ORTHOPEDICS 7K - 7K-11/011-A    Time In: 3557  Time Out: 1440  Timed Code Treatment Minutes: 10 Minutes  Minutes: 20          Date: 7/3/2019  Patient Name: Jose Antonio Liz,  Gender:  female        MRN: [id number]  : 1965  (46 y.o.)      Referring Practitioner: Jabari Finnegan PA-C  Diagnosis: Spinal stenosis  Additional Pertinent Hx: Pt admitted -3 for back sx . Lumbar mallory/PSF L3L5     Past Medical History:   Diagnosis Date    Arthritis     Cancer [facility])     Cervical     Past Surgical History:   Procedure Laterality Date    COLONOSCOPY         Restrictions/Precautions:  Fall Risk, General Precautions                    Spinal Precautions: No Bending, No Lifting, No Twisting  Other position/activity restrictions: back sx 7-3  Spinal: Lumbar Corset    Subjective:  Chart Reviewed: Yes  Patient assessed for rehabilitation services?: Yes  Family / Caregiver Present: Yes  Subjective: Pt in bed agreeable to PT and up to chair     General:  Overall Orientation Status: Within Functional Limits  Follows Commands: Within Functional Limits    Vision: Within Functional Limits    Hearing: Within functional limits         Pain:  Yes.   Pain Assessment  Pain Assessment: 0-10  Pain Level: 8  Pain Type: Surgical pain  Pain Location: Back       Social/Functional History:    Lives With: Spouse  Type of Home: House  Home Layout: Two level, Able to Live on Main level with bedroom/bathroom  Home Access: Ramped entrance  Home Equipment: 4 wheeled walker             ADL Assistance: Independent  Homemaking Assistance: Independent  Ambulation Assistance: Independent  Transfer Assistance: Independent          Additional Comments: Pt was not using AD prior to admssion      Objective:       RLE AROM: WFL         LLE AROM : WFL                   Strength RLE: WFL    Strength LLE: WFL             Balance  Sitting - Static: Good  Sitting - Dynamic: Good  Standing - Static: Fair, +  Standing - Dynamic: Fair    Rolling to Left: Minimal assistance(of 1 with bedrail starting in hooklying)  Supine to Sit: Minimal assistance(of 1 from left sidelying with bedrail,assist needed at trunk and LEs)  Scooting: Minimal assistance    Transfers  Sit to Stand: Minimal Assistance(of 1 from bed, cues on hand placement )  Stand to sit: Minimal Assistance(to chair, cues on handplacement )       Ambulation 1  Surface: level tile  Device: Rolling Walker  Assistance: Contact guard assistance(of 1)  Quality of Gait: slow and guarded   Gait Deviations: Deviated path, Slow Shantal, Increased JASMIN, Decreased step length  Distance: 5 feet          Exercises:  Comments: Pt completed 10 reps quad and glute sets ,ankle pumps to imporve strength for function             Activity Tolerance:  Activity Tolerance: Patient Tolerated treatment well;Patient limited by pain    Treatment Initiated: see therex  Assessment: Body structures, Functions, Activity limitations: Decreased functional mobility , Decreased endurance, Decreased balance, Increased Pain  Assessment: Pt had back sx today so limited by ROM and pain. Pt needs skilled PT to educate proper body mechanics and improve safety with gait   Prognosis: Good    Clinical Presentation: Moderate - Evolving with Changing Characteristics: Pt had back sx today so limited by ROM and pain. Pt needs skilled PT to educate proper body mechanics and improve safety with gait     Decision Making:  Moderate Complexitybased on patient assessment and decision making process of determining plan of care and establishing reasonable expectations for measurable functional outcomes    REQUIRES PT FOLLOW UP: Yes    Discharge Recommendations:  Discharge Recommendations: Continue to assess pending progress, 24 hour supervision or assist    Patient Education:  Patient Education: POC, log roll, therex    Equipment Recommendations:  Equipment Needed: No    Safety:  Type of devices: Left in chair,

## 2019-07-04 LAB
BASOPHILS # BLD: 0 %
BASOPHILS ABSOLUTE: 0 THOU/MM3 (ref 0–0.1)
EOSINOPHIL # BLD: 0 %
EOSINOPHILS ABSOLUTE: 0 THOU/MM3 (ref 0–0.4)
ERYTHROCYTE [DISTWIDTH] IN BLOOD BY AUTOMATED COUNT: 13.4 % (ref 11.5–14.5)
ERYTHROCYTE [DISTWIDTH] IN BLOOD BY AUTOMATED COUNT: 50 FL (ref 35–45)
HCT VFR BLD CALC: 22.2 % (ref 37–47)
HCT VFR BLD CALC: 27.3 % (ref 37–47)
HEMOGLOBIN: 7.2 GM/DL (ref 12–16)
HEMOGLOBIN: 8.9 GM/DL (ref 12–16)
IMMATURE GRANS (ABS): 0.02 THOU/MM3 (ref 0–0.07)
IMMATURE GRANULOCYTES: 0.3 %
LYMPHOCYTES # BLD: 14.6 %
LYMPHOCYTES ABSOLUTE: 0.9 THOU/MM3 (ref 1–4.8)
MCH RBC QN AUTO: 33 PG (ref 26–33)
MCHC RBC AUTO-ENTMCNC: 32.4 GM/DL (ref 32.2–35.5)
MCV RBC AUTO: 101.8 FL (ref 81–99)
MONOCYTES # BLD: 7.3 %
MONOCYTES ABSOLUTE: 0.5 THOU/MM3 (ref 0.4–1.3)
NUCLEATED RED BLOOD CELLS: 0 /100 WBC
PLATELET # BLD: 163 THOU/MM3 (ref 130–400)
PMV BLD AUTO: 10.4 FL (ref 9.4–12.4)
RBC # BLD: 2.18 MILL/MM3 (ref 4.2–5.4)
SEG NEUTROPHILS: 77.8 %
SEGMENTED NEUTROPHILS ABSOLUTE COUNT: 4.8 THOU/MM3 (ref 1.8–7.7)
WBC # BLD: 6.2 THOU/MM3 (ref 4.8–10.8)

## 2019-07-04 PROCEDURE — 6360000002 HC RX W HCPCS: Performed by: PHYSICIAN ASSISTANT

## 2019-07-04 PROCEDURE — 85018 HEMOGLOBIN: CPT

## 2019-07-04 PROCEDURE — 1200000000 HC SEMI PRIVATE

## 2019-07-04 PROCEDURE — 6370000000 HC RX 637 (ALT 250 FOR IP): Performed by: PHYSICIAN ASSISTANT

## 2019-07-04 PROCEDURE — 36415 COLL VENOUS BLD VENIPUNCTURE: CPT

## 2019-07-04 PROCEDURE — 2580000003 HC RX 258: Performed by: PHYSICIAN ASSISTANT

## 2019-07-04 PROCEDURE — 85025 COMPLETE CBC W/AUTO DIFF WBC: CPT

## 2019-07-04 PROCEDURE — 85014 HEMATOCRIT: CPT

## 2019-07-04 RX ADMIN — SODIUM CHLORIDE: 9 INJECTION, SOLUTION INTRAVENOUS at 15:11

## 2019-07-04 RX ADMIN — DOCUSATE SODIUM 100 MG: 100 CAPSULE, LIQUID FILLED ORAL at 21:41

## 2019-07-04 RX ADMIN — DIPHENHYDRAMINE HCL 50 MG: 25 TABLET ORAL at 21:42

## 2019-07-04 RX ADMIN — OXYCODONE HYDROCHLORIDE AND ACETAMINOPHEN 2 TABLET: 5; 325 TABLET ORAL at 11:59

## 2019-07-04 RX ADMIN — ACETAMINOPHEN 1000 MG: 500 TABLET ORAL at 21:41

## 2019-07-04 RX ADMIN — OXYCODONE HYDROCHLORIDE AND ACETAMINOPHEN 2 TABLET: 5; 325 TABLET ORAL at 04:43

## 2019-07-04 RX ADMIN — Medication 2 G: at 01:16

## 2019-07-04 RX ADMIN — OXYCODONE HYDROCHLORIDE AND ACETAMINOPHEN 2 TABLET: 5; 325 TABLET ORAL at 00:27

## 2019-07-04 RX ADMIN — OXYCODONE HYDROCHLORIDE AND ACETAMINOPHEN 2 TABLET: 5; 325 TABLET ORAL at 19:49

## 2019-07-04 RX ADMIN — SODIUM CHLORIDE: 9 INJECTION, SOLUTION INTRAVENOUS at 21:42

## 2019-07-04 RX ADMIN — POLYETHYLENE GLYCOL 3350 17 G: 17 POWDER, FOR SOLUTION ORAL at 09:15

## 2019-07-04 RX ADMIN — DOCUSATE SODIUM 100 MG: 100 CAPSULE, LIQUID FILLED ORAL at 09:15

## 2019-07-04 ASSESSMENT — PAIN DESCRIPTION - ORIENTATION
ORIENTATION: LOWER
ORIENTATION: MID;LOWER

## 2019-07-04 ASSESSMENT — PAIN DESCRIPTION - DESCRIPTORS
DESCRIPTORS: ACHING
DESCRIPTORS: ACHING

## 2019-07-04 ASSESSMENT — PAIN DESCRIPTION - FREQUENCY
FREQUENCY: CONTINUOUS
FREQUENCY: CONTINUOUS

## 2019-07-04 ASSESSMENT — PAIN SCALES - GENERAL
PAINLEVEL_OUTOF10: 9
PAINLEVEL_OUTOF10: 9
PAINLEVEL_OUTOF10: 8
PAINLEVEL_OUTOF10: 9
PAINLEVEL_OUTOF10: 8
PAINLEVEL_OUTOF10: 0
PAINLEVEL_OUTOF10: 9

## 2019-07-04 ASSESSMENT — PAIN DESCRIPTION - PROGRESSION
CLINICAL_PROGRESSION: NOT CHANGED
CLINICAL_PROGRESSION: NOT CHANGED

## 2019-07-04 ASSESSMENT — PAIN DESCRIPTION - PAIN TYPE
TYPE: SURGICAL PAIN
TYPE: SURGICAL PAIN

## 2019-07-04 ASSESSMENT — PAIN - FUNCTIONAL ASSESSMENT
PAIN_FUNCTIONAL_ASSESSMENT: PREVENTS OR INTERFERES SOME ACTIVE ACTIVITIES AND ADLS
PAIN_FUNCTIONAL_ASSESSMENT: PREVENTS OR INTERFERES SOME ACTIVE ACTIVITIES AND ADLS

## 2019-07-04 ASSESSMENT — PAIN DESCRIPTION - LOCATION
LOCATION: BACK
LOCATION: BACK

## 2019-07-04 ASSESSMENT — PAIN DESCRIPTION - ONSET
ONSET: ON-GOING
ONSET: ON-GOING

## 2019-07-05 LAB
BASOPHILS # BLD: 0 %
BASOPHILS ABSOLUTE: 0 THOU/MM3 (ref 0–0.1)
EOSINOPHIL # BLD: 0.3 %
EOSINOPHILS ABSOLUTE: 0 THOU/MM3 (ref 0–0.4)
ERYTHROCYTE [DISTWIDTH] IN BLOOD BY AUTOMATED COUNT: 14.4 % (ref 11.5–14.5)
ERYTHROCYTE [DISTWIDTH] IN BLOOD BY AUTOMATED COUNT: 52.9 FL (ref 35–45)
HCT VFR BLD CALC: 26.2 % (ref 37–47)
HEMOGLOBIN: 8.7 GM/DL (ref 12–16)
IMMATURE GRANS (ABS): 0.02 THOU/MM3 (ref 0–0.07)
IMMATURE GRANULOCYTES: 0.3 %
LYMPHOCYTES # BLD: 18.2 %
LYMPHOCYTES ABSOLUTE: 1.1 THOU/MM3 (ref 1–4.8)
MCH RBC QN AUTO: 33.3 PG (ref 26–33)
MCHC RBC AUTO-ENTMCNC: 33.2 GM/DL (ref 32.2–35.5)
MCV RBC AUTO: 100.4 FL (ref 81–99)
MONOCYTES # BLD: 7.2 %
MONOCYTES ABSOLUTE: 0.4 THOU/MM3 (ref 0.4–1.3)
NUCLEATED RED BLOOD CELLS: 0 /100 WBC
PLATELET # BLD: 164 THOU/MM3 (ref 130–400)
PMV BLD AUTO: 10.3 FL (ref 9.4–12.4)
RBC # BLD: 2.61 MILL/MM3 (ref 4.2–5.4)
SEG NEUTROPHILS: 74 %
SEGMENTED NEUTROPHILS ABSOLUTE COUNT: 4.6 THOU/MM3 (ref 1.8–7.7)
WBC # BLD: 6.2 THOU/MM3 (ref 4.8–10.8)

## 2019-07-05 PROCEDURE — 97110 THERAPEUTIC EXERCISES: CPT

## 2019-07-05 PROCEDURE — 1200000000 HC SEMI PRIVATE

## 2019-07-05 PROCEDURE — 36415 COLL VENOUS BLD VENIPUNCTURE: CPT

## 2019-07-05 PROCEDURE — 85025 COMPLETE CBC W/AUTO DIFF WBC: CPT

## 2019-07-05 PROCEDURE — 6370000000 HC RX 637 (ALT 250 FOR IP): Performed by: PHYSICIAN ASSISTANT

## 2019-07-05 PROCEDURE — 97535 SELF CARE MNGMENT TRAINING: CPT

## 2019-07-05 PROCEDURE — 97530 THERAPEUTIC ACTIVITIES: CPT

## 2019-07-05 PROCEDURE — 97166 OT EVAL MOD COMPLEX 45 MIN: CPT

## 2019-07-05 PROCEDURE — 2580000003 HC RX 258: Performed by: PHYSICIAN ASSISTANT

## 2019-07-05 RX ORDER — BISACODYL 10 MG
10 SUPPOSITORY, RECTAL RECTAL DAILY
Status: DISCONTINUED | OUTPATIENT
Start: 2019-07-05 | End: 2019-07-07 | Stop reason: HOSPADM

## 2019-07-05 RX ADMIN — CYCLOBENZAPRINE HYDROCHLORIDE 10 MG: 10 TABLET, FILM COATED ORAL at 00:15

## 2019-07-05 RX ADMIN — OXYCODONE HYDROCHLORIDE AND ACETAMINOPHEN 2 TABLET: 5; 325 TABLET ORAL at 08:56

## 2019-07-05 RX ADMIN — OXYCODONE HYDROCHLORIDE AND ACETAMINOPHEN 2 TABLET: 5; 325 TABLET ORAL at 20:31

## 2019-07-05 RX ADMIN — CYCLOBENZAPRINE HYDROCHLORIDE 10 MG: 10 TABLET, FILM COATED ORAL at 18:39

## 2019-07-05 RX ADMIN — SODIUM CHLORIDE: 9 INJECTION, SOLUTION INTRAVENOUS at 14:26

## 2019-07-05 RX ADMIN — POLYETHYLENE GLYCOL 3350 17 G: 17 POWDER, FOR SOLUTION ORAL at 08:56

## 2019-07-05 RX ADMIN — DOCUSATE SODIUM 100 MG: 100 CAPSULE, LIQUID FILLED ORAL at 08:56

## 2019-07-05 RX ADMIN — OXYCODONE HYDROCHLORIDE AND ACETAMINOPHEN 2 TABLET: 5; 325 TABLET ORAL at 04:41

## 2019-07-05 RX ADMIN — SODIUM CHLORIDE: 9 INJECTION, SOLUTION INTRAVENOUS at 04:50

## 2019-07-05 RX ADMIN — CYCLOBENZAPRINE HYDROCHLORIDE 10 MG: 10 TABLET, FILM COATED ORAL at 09:11

## 2019-07-05 RX ADMIN — OXYCODONE HYDROCHLORIDE AND ACETAMINOPHEN 1 TABLET: 5; 325 TABLET ORAL at 13:27

## 2019-07-05 RX ADMIN — BISACODYL 10 MG: 10 SUPPOSITORY RECTAL at 14:43

## 2019-07-05 RX ADMIN — OXYCODONE HYDROCHLORIDE AND ACETAMINOPHEN 2 TABLET: 5; 325 TABLET ORAL at 00:28

## 2019-07-05 RX ADMIN — DOCUSATE SODIUM 100 MG: 100 CAPSULE, LIQUID FILLED ORAL at 20:31

## 2019-07-05 ASSESSMENT — PAIN DESCRIPTION - LOCATION
LOCATION: BACK

## 2019-07-05 ASSESSMENT — PAIN DESCRIPTION - ONSET
ONSET: ON-GOING

## 2019-07-05 ASSESSMENT — PAIN DESCRIPTION - DESCRIPTORS
DESCRIPTORS: STABBING
DESCRIPTORS: ACHING

## 2019-07-05 ASSESSMENT — PAIN - FUNCTIONAL ASSESSMENT
PAIN_FUNCTIONAL_ASSESSMENT: PREVENTS OR INTERFERES SOME ACTIVE ACTIVITIES AND ADLS
PAIN_FUNCTIONAL_ASSESSMENT: PREVENTS OR INTERFERES WITH MANY ACTIVE NOT PASSIVE ACTIVITIES
PAIN_FUNCTIONAL_ASSESSMENT: PREVENTS OR INTERFERES SOME ACTIVE ACTIVITIES AND ADLS
PAIN_FUNCTIONAL_ASSESSMENT: PREVENTS OR INTERFERES SOME ACTIVE ACTIVITIES AND ADLS

## 2019-07-05 ASSESSMENT — PAIN DESCRIPTION - FREQUENCY
FREQUENCY: CONTINUOUS

## 2019-07-05 ASSESSMENT — PAIN SCALES - GENERAL
PAINLEVEL_OUTOF10: 8
PAINLEVEL_OUTOF10: 7
PAINLEVEL_OUTOF10: 8
PAINLEVEL_OUTOF10: 0
PAINLEVEL_OUTOF10: 6
PAINLEVEL_OUTOF10: 6
PAINLEVEL_OUTOF10: 8
PAINLEVEL_OUTOF10: 0
PAINLEVEL_OUTOF10: 7
PAINLEVEL_OUTOF10: 0
PAINLEVEL_OUTOF10: 7
PAINLEVEL_OUTOF10: 8
PAINLEVEL_OUTOF10: 8
PAINLEVEL_OUTOF10: 5
PAINLEVEL_OUTOF10: 7

## 2019-07-05 ASSESSMENT — PAIN DESCRIPTION - PAIN TYPE
TYPE: SURGICAL PAIN

## 2019-07-05 ASSESSMENT — PAIN DESCRIPTION - PROGRESSION
CLINICAL_PROGRESSION: NOT CHANGED

## 2019-07-05 ASSESSMENT — PAIN DESCRIPTION - ORIENTATION
ORIENTATION: LOWER

## 2019-07-05 NOTE — PLAN OF CARE
Problem: Cerebrospinal Fluid Leakage - Risk Of:  Goal: Absence of postural headache  Description  Absence of postural headache  Outcome: Met This Shift  Note:   Denies any dizziness or headaches today     Problem: Infection - Surgical Site:  Goal: Will show no infection signs and symptoms  Description  Will show no infection signs and symptoms  Outcome: Met This Shift  Note:   Afebrile. Vs wnl. Denies all fever and chills. Wbc wnl     Problem: Mobility - Impaired:  Goal: Compliance with physical therapy regimen will improve  Description  Compliance with physical therapy regimen will improve  Outcome: Met This Shift  Note:   Pt up today working with therapy. Tolerated activity fairly well and able to ambulate with slightly unsteady gait. Problem: Urinary Retention:  Goal: Urinary elimination within specified parameters  Description  Urinary elimination within specified parameters  Outcome: Met This Shift  Note:   Voiding quantities sufficient     Problem: Venous Thromboembolism:  Goal: Will show no signs or symptoms of venous thromboembolism  Description  Will show no signs or symptoms of venous thromboembolism  Outcome: Met This Shift  Note:   Denies all s/s dvt. Problem: Respiratory  Goal: No pulmonary complications  Outcome: Met This Shift  Note:   Lungs clear, slightly diminished at bases. Denies all chest pain and/or shortness of breath. Oxygen saturation greater than 90% on room air     Problem: Falls - Risk of:  Goal: Will remain free from falls  Description  Will remain free from falls  Outcome: Met This Shift  Note:   No falls noted this shift. Patient ambulates with x1 staff assistance without difficulty. Safe environment maintained. Bedside table & call light in reach. Uses call light appropriately when needing assistance. Problem: Pain:  Goal: Pain level will decrease  Description  Pain level will decrease  Outcome: Ongoing  Note:   Pain goal \"5\".   Rating back pain @ 6 with oral pain meds available and given per pt request.  At hourly check pt asleep with rr greater than 10. Problem: Discharge Planning:  Goal: Discharged to appropriate level of care  Description  Discharged to appropriate level of care  Outcome: Ongoing  Note:   Planning home at discharge with spouse and home health. Problem: Neurological  Goal: Maximum potential motor/sensory/cognitive function  Outcome: Ongoing  Note:   Ambulates with walker, gait belt and back brace. Standby assist required by staff. Gait slightly unsteady today again, tolerating activity fairly well. Problem: GI  Goal: No bowel complications  Outcome: Ongoing  Note:   Abdomen soft with hypoactive bs. Per pt not passing much flatus and has not had bm. Per pt since cancer surgery she is either constipated or having diarrhea. Given suppository with no results yet. Care plan reviewed with patient and spouse who verbalized understanding of the plan of care and contribute to goal setting.

## 2019-07-05 NOTE — PROGRESS NOTES
Physical Therapy   [address] [address]  INPATIENT PHYSICAL THERAPY  DAILY NOTE  [facility] ORTHOPEDICS 7K - 7K-11/011-A  Time In: 1852  Time Out: 6470  Timed Code Treatment Minutes: 24 Minutes  Minutes: 24          Date: 2019  Patient Name: Evita Sorensen,  Gender:  female        MRN: [id number]  : 1965  (46 y.o.)     Referring Practitioner: Erin Son PA-C  Diagnosis: Spinal stenosis  Additional Pertinent Hx: Pt admitted 7-3 for back sx . Lumbar mallory/PSF L3L5     Prior Level of Function:  Lives With: Spouse  Type of Home: House  Home Layout: Two level, Able to Live on Main level with bedroom/bathroom  Home Access: Ramped entrance  Home Equipment: 4 wheeled walker    Restrictions/Precautions:  Restrictions/Precautions: Fall Risk, General Precautions  Required Braces or Orthoses  Spinal: Lumbar Corset  Position Activity Restriction  Spinal Precautions: No Bending, No Lifting, No Twisting  Other position/activity restrictions: back sx 7-3    SUBJECTIVE: RN approved treatment session. Patient in bedside chair upon arrival and agreeable to therapy. Patient requested use of bedside commode at start of treatment session and to return to bed at conclusion of treatment session. Patient was emotional during treatment session stating that she had pain. Patient's friend present and encouraging. PAIN: 9/10    OBJECTIVE:  Bed Mobility:  Rolling to Left: Contact Guard Assistance, with use of bed rail and required cues on proper log rolling technique to maintain her spinal precautions. Sit to Supine: Minimal Assistance, at [facility]     Transfers:  Sit to Stand: Contact Guard Assistance  Stand to Sit:Contact Guard Assistance    Ambulation:  Contact Guard Assistance  Distance: 12'x1, 3'x1, patient refused further ambulation  Surface: Level Tile  Device:Rolling Walker  Gait Deviations:   Forward Flexed Posture, Slow Shantal, Decreased Step Length Bilaterally, Decreased Gait Speed and Decreased Heel Strike

## 2019-07-05 NOTE — CARE COORDINATION
7/5/19, 7:36 AM      Ferny Torres       Admitted from: PACU 7/3/2019/ [address] day: 2   Location: [facility]11/011-A Reason for admit: Spinal stenosis [M48.00]  Spinal stenosis [M48.00] Status: inpatient  Admit order signed?: yes  PMH:  has a past medical history of Arthritis and Cancer ([address] Procedure: 7/3/19 surgery by Dr Elian Forbes: LUMBAR LAMINECTOMY/PSF L3-L5  Pertinent abnormal Imaging:no  Medications:  Scheduled Meds:   sodium chloride flush  10 mL Intravenous 2 times per day    docusate sodium  100 mg Oral BID    polyethylene glycol  17 g Oral Daily     Continuous Infusions:   sodium chloride 125 mL/hr at 07/05/19 0450      Pertinent Info/Orders/Treatment Plan:  PT/OT. Pain management. N/V checks. I&O. Ileus prevention measures. Got blood yesterday. Hgb 8.7 today. Monitor wound drains output. Diet: DIET GENERAL;   Smoking status:  reports that she quit smoking about 5 years ago. She smoked 0.50 packs per day. She has never used smokeless tobacco.   PCP: Hue Rogers MD  Readmission: no  Readmission Risk Score: 8%    Discharge Planning  Current Residence:  Private Residence  Living Arrangements:  Spouse/Significant Other   Support Systems:  Spouse/Significant Other  Current Services PTA:     Potential Assistance Needed:  Home Care  Potential Assistance Purchasing Medications:  No  Does patient want to participate in local refill/ meds to beds program?  Yes  Type of Home Care Services:  Nursing Services  Patient expects to be discharged to:  home  Expected Discharge date:  07/06/19  Follow Up Appointment: Best Day/ Time: Wednesday PM    Discharge Plan: I spoke with Sandrasuly Jeronimo. She plans to go home with her  at discharge. She has needed equipment for home use. I discussed with her that Dr Elian Forbes uses [facility] or [facility] for home wound drain management. She chose [facility].  I called referral to [facility] at [address] [place] [address] Evaluation: no

## 2019-07-06 LAB
BASOPHILS # BLD: 0.2 %
BASOPHILS ABSOLUTE: 0 THOU/MM3 (ref 0–0.1)
EOSINOPHIL # BLD: 0.9 %
EOSINOPHILS ABSOLUTE: 0.1 THOU/MM3 (ref 0–0.4)
ERYTHROCYTE [DISTWIDTH] IN BLOOD BY AUTOMATED COUNT: 13.6 % (ref 11.5–14.5)
ERYTHROCYTE [DISTWIDTH] IN BLOOD BY AUTOMATED COUNT: 50.4 FL (ref 35–45)
HCT VFR BLD CALC: 28.8 % (ref 37–47)
HEMOGLOBIN: 9.4 GM/DL (ref 12–16)
IMMATURE GRANS (ABS): 0.04 THOU/MM3 (ref 0–0.07)
IMMATURE GRANULOCYTES: 0.7 %
LYMPHOCYTES # BLD: 16.3 %
LYMPHOCYTES ABSOLUTE: 0.9 THOU/MM3 (ref 1–4.8)
MCH RBC QN AUTO: 32.8 PG (ref 26–33)
MCHC RBC AUTO-ENTMCNC: 32.6 GM/DL (ref 32.2–35.5)
MCV RBC AUTO: 100.3 FL (ref 81–99)
MONOCYTES # BLD: 9.6 %
MONOCYTES ABSOLUTE: 0.6 THOU/MM3 (ref 0.4–1.3)
NUCLEATED RED BLOOD CELLS: 0 /100 WBC
PLATELET # BLD: 170 THOU/MM3 (ref 130–400)
PMV BLD AUTO: 9.9 FL (ref 9.4–12.4)
RBC # BLD: 2.87 MILL/MM3 (ref 4.2–5.4)
SEG NEUTROPHILS: 72.3 %
SEGMENTED NEUTROPHILS ABSOLUTE COUNT: 4.2 THOU/MM3 (ref 1.8–7.7)
WBC # BLD: 5.8 THOU/MM3 (ref 4.8–10.8)

## 2019-07-06 PROCEDURE — 85025 COMPLETE CBC W/AUTO DIFF WBC: CPT

## 2019-07-06 PROCEDURE — 97116 GAIT TRAINING THERAPY: CPT

## 2019-07-06 PROCEDURE — 97110 THERAPEUTIC EXERCISES: CPT

## 2019-07-06 PROCEDURE — 36415 COLL VENOUS BLD VENIPUNCTURE: CPT

## 2019-07-06 PROCEDURE — 97530 THERAPEUTIC ACTIVITIES: CPT

## 2019-07-06 PROCEDURE — 2580000003 HC RX 258: Performed by: PHYSICIAN ASSISTANT

## 2019-07-06 PROCEDURE — 1200000000 HC SEMI PRIVATE

## 2019-07-06 PROCEDURE — 6370000000 HC RX 637 (ALT 250 FOR IP): Performed by: PHYSICIAN ASSISTANT

## 2019-07-06 RX ADMIN — Medication 10 ML: at 20:40

## 2019-07-06 RX ADMIN — DOCUSATE SODIUM 100 MG: 100 CAPSULE, LIQUID FILLED ORAL at 20:39

## 2019-07-06 RX ADMIN — DOCUSATE SODIUM 100 MG: 100 CAPSULE, LIQUID FILLED ORAL at 09:35

## 2019-07-06 RX ADMIN — CYCLOBENZAPRINE HYDROCHLORIDE 10 MG: 10 TABLET, FILM COATED ORAL at 20:39

## 2019-07-06 RX ADMIN — OXYCODONE HYDROCHLORIDE AND ACETAMINOPHEN 1 TABLET: 5; 325 TABLET ORAL at 20:38

## 2019-07-06 RX ADMIN — OXYCODONE HYDROCHLORIDE AND ACETAMINOPHEN 2 TABLET: 5; 325 TABLET ORAL at 12:48

## 2019-07-06 RX ADMIN — CYCLOBENZAPRINE HYDROCHLORIDE 10 MG: 10 TABLET, FILM COATED ORAL at 09:34

## 2019-07-06 RX ADMIN — OXYCODONE HYDROCHLORIDE AND ACETAMINOPHEN 2 TABLET: 5; 325 TABLET ORAL at 00:38

## 2019-07-06 RX ADMIN — OXYCODONE HYDROCHLORIDE AND ACETAMINOPHEN 1 TABLET: 5; 325 TABLET ORAL at 06:05

## 2019-07-06 ASSESSMENT — PAIN SCALES - GENERAL
PAINLEVEL_OUTOF10: 6
PAINLEVEL_OUTOF10: 5
PAINLEVEL_OUTOF10: 8
PAINLEVEL_OUTOF10: 6
PAINLEVEL_OUTOF10: 6
PAINLEVEL_OUTOF10: 7
PAINLEVEL_OUTOF10: 8
PAINLEVEL_OUTOF10: 5
PAINLEVEL_OUTOF10: 6
PAINLEVEL_OUTOF10: 7

## 2019-07-06 ASSESSMENT — PAIN DESCRIPTION - DESCRIPTORS
DESCRIPTORS: ACHING
DESCRIPTORS: ACHING

## 2019-07-06 ASSESSMENT — PAIN DESCRIPTION - FREQUENCY
FREQUENCY: CONTINUOUS
FREQUENCY: CONTINUOUS

## 2019-07-06 ASSESSMENT — PAIN DESCRIPTION - PROGRESSION
CLINICAL_PROGRESSION: GRADUALLY IMPROVING
CLINICAL_PROGRESSION: GRADUALLY IMPROVING

## 2019-07-06 ASSESSMENT — PAIN DESCRIPTION - ORIENTATION
ORIENTATION: LOWER;MID
ORIENTATION: LOWER

## 2019-07-06 ASSESSMENT — PAIN DESCRIPTION - PAIN TYPE
TYPE: SURGICAL PAIN

## 2019-07-06 ASSESSMENT — PAIN DESCRIPTION - ONSET
ONSET: ON-GOING
ONSET: ON-GOING

## 2019-07-06 ASSESSMENT — PAIN DESCRIPTION - LOCATION
LOCATION: BACK

## 2019-07-06 NOTE — PROGRESS NOTES
completed for increased independence with functional mobility. HEP given to pt and family and educated in   Functional Outcome Measures: Completed  AM-PAC Inpatient Mobility without Stair Climbing Raw Score : 15  AM-PAC Inpatient without Stair Climbing T-Scale Score : 43.03    ASSESSMENT:  Assessment: Patient progressing toward established goals. Activity Tolerance:  Patient tolerance of  treatment: good. Equipment Recommendations:Equipment Needed: No  Discharge Recommendations:  Discharge Recommendations: Continue to assess pending progress, 24 hour supervision or assist    Plan: Times per week: 5-6 X O  Current Treatment Recommendations: Strengthening, Gait Training, Balance Training, Functional Mobility Training, Transfer Training, Endurance Training, Home Exercise Program    Patient Education  Patient Education: Home Exercise Program, Precautions/Restrictions, Family Education    Goals:  Patient goals : Go home  Short term goals  Time Frame for Short term goals: by discharge   Short term goal 1: supine to sit and return with SBA to get in and out of bed   Short term goal 2: sit to stand with SBA to get on and off various surfaces  Short term goal 3: ambulate with  feet ormore and S to walk household and community distances  Short term goal 4: ascend/descend a ramp with RW and CGA to enter home  Long term goals  Time Frame for Long term goals : NA due to short ELOS    Following session, patient left in safe position with all fall risk precautions in place.

## 2019-07-06 NOTE — PLAN OF CARE
Problem: Cerebrospinal Fluid Leakage - Risk Of:  Goal: Absence of postural headache  Description  Absence of postural headache  Outcome: Met This Shift  Note:   Denies having postural headache     Problem: Infection - Surgical Site:  Goal: Will show no infection signs and symptoms  Description  Will show no infection signs and symptoms  Outcome: Met This Shift  Note:   Afebrile. Problem: Urinary Retention:  Goal: Urinary elimination within specified parameters  Description  Urinary elimination within specified parameters  Outcome: Met This Shift  Note:   Voiding quantities sufficient per bedside commode     Problem: Venous Thromboembolism:  Goal: Will show no signs or symptoms of venous thromboembolism  Description  Will show no signs or symptoms of venous thromboembolism  Outcome: Met This Shift  Note:   Denies all s/s dvt     Problem: Neurological  Goal: Maximum potential motor/sensory/cognitive function  Outcome: Met This Shift  Note:   Alert and oriented x4. No confusion noted. Neurological assessment wnl     Problem: Respiratory  Goal: No pulmonary complications  Outcome: Met This Shift  Note:   Lungs clear. Denies all chest pain and/or shortness of breath. Oxygen saturation greater than 90% on ra     Problem: GI  Goal: No bowel complications  Outcome: Met This Shift  Note:   Abdomen soft w abs x4. Has had bm since surgery     Problem: Falls - Risk of:  Goal: Will remain free from falls  Description  Will remain free from falls  Outcome: Met This Shift  Note:   No falls noted this shift. Patient ambulates with x1 staff assistance with minor difficulty. Bed kept in low position. Safe environment maintained. Bedside table & call light in reach. Uses call light appropriately when needing assistance. Problem: Pain:  Goal: Pain level will decrease  Description  Pain level will decrease  Outcome: Ongoing  Note:   Pain goal '5\".    Rating pain from 3-5 with oral pain meds available and given per pt

## 2019-07-07 VITALS
WEIGHT: 90 LBS | TEMPERATURE: 97.4 F | BODY MASS INDEX: 15.95 KG/M2 | HEIGHT: 63 IN | OXYGEN SATURATION: 99 % | DIASTOLIC BLOOD PRESSURE: 84 MMHG | RESPIRATION RATE: 16 BRPM | SYSTOLIC BLOOD PRESSURE: 151 MMHG | HEART RATE: 97 BPM

## 2019-07-07 LAB
BASOPHILS # BLD: 0.2 %
BASOPHILS ABSOLUTE: 0 THOU/MM3 (ref 0–0.1)
EOSINOPHIL # BLD: 1.3 %
EOSINOPHILS ABSOLUTE: 0.1 THOU/MM3 (ref 0–0.4)
ERYTHROCYTE [DISTWIDTH] IN BLOOD BY AUTOMATED COUNT: 13.2 % (ref 11.5–14.5)
ERYTHROCYTE [DISTWIDTH] IN BLOOD BY AUTOMATED COUNT: 46.5 FL (ref 35–45)
HCT VFR BLD CALC: 27.8 % (ref 37–47)
HEMOGLOBIN: 9.5 GM/DL (ref 12–16)
IMMATURE GRANS (ABS): 0.03 THOU/MM3 (ref 0–0.07)
IMMATURE GRANULOCYTES: 0.5 %
LYMPHOCYTES # BLD: 12.3 %
LYMPHOCYTES ABSOLUTE: 0.7 THOU/MM3 (ref 1–4.8)
MCH RBC QN AUTO: 33 PG (ref 26–33)
MCHC RBC AUTO-ENTMCNC: 34.2 GM/DL (ref 32.2–35.5)
MCV RBC AUTO: 96.5 FL (ref 81–99)
MONOCYTES # BLD: 9 %
MONOCYTES ABSOLUTE: 0.5 THOU/MM3 (ref 0.4–1.3)
NUCLEATED RED BLOOD CELLS: 0 /100 WBC
PLATELET # BLD: 183 THOU/MM3 (ref 130–400)
PMV BLD AUTO: 10 FL (ref 9.4–12.4)
RBC # BLD: 2.88 MILL/MM3 (ref 4.2–5.4)
SEG NEUTROPHILS: 76.7 %
SEGMENTED NEUTROPHILS ABSOLUTE COUNT: 4.2 THOU/MM3 (ref 1.8–7.7)
WBC # BLD: 5.5 THOU/MM3 (ref 4.8–10.8)

## 2019-07-07 PROCEDURE — 2580000003 HC RX 258: Performed by: PHYSICIAN ASSISTANT

## 2019-07-07 PROCEDURE — 36415 COLL VENOUS BLD VENIPUNCTURE: CPT

## 2019-07-07 PROCEDURE — 85025 COMPLETE CBC W/AUTO DIFF WBC: CPT

## 2019-07-07 PROCEDURE — 6370000000 HC RX 637 (ALT 250 FOR IP): Performed by: PHYSICIAN ASSISTANT

## 2019-07-07 RX ORDER — CYCLOBENZAPRINE HCL 10 MG
10 TABLET ORAL 3 TIMES DAILY PRN
Qty: 50 TABLET | Refills: 0 | Status: SHIPPED | OUTPATIENT
Start: 2019-07-07 | End: 2019-07-17

## 2019-07-07 RX ORDER — OXYCODONE HYDROCHLORIDE AND ACETAMINOPHEN 5; 325 MG/1; MG/1
1-2 TABLET ORAL EVERY 4 HOURS PRN
Qty: 42 TABLET | Refills: 0 | Status: SHIPPED | OUTPATIENT
Start: 2019-07-07 | End: 2019-07-14

## 2019-07-07 RX ADMIN — Medication 10 ML: at 09:02

## 2019-07-07 RX ADMIN — POLYETHYLENE GLYCOL 3350 17 G: 17 POWDER, FOR SOLUTION ORAL at 09:01

## 2019-07-07 RX ADMIN — OXYCODONE HYDROCHLORIDE AND ACETAMINOPHEN 1 TABLET: 5; 325 TABLET ORAL at 01:07

## 2019-07-07 RX ADMIN — OXYCODONE HYDROCHLORIDE AND ACETAMINOPHEN 1 TABLET: 5; 325 TABLET ORAL at 12:29

## 2019-07-07 RX ADMIN — CYCLOBENZAPRINE HYDROCHLORIDE 10 MG: 10 TABLET, FILM COATED ORAL at 05:32

## 2019-07-07 RX ADMIN — CYCLOBENZAPRINE HYDROCHLORIDE 10 MG: 10 TABLET, FILM COATED ORAL at 09:01

## 2019-07-07 RX ADMIN — OXYCODONE HYDROCHLORIDE AND ACETAMINOPHEN 1 TABLET: 5; 325 TABLET ORAL at 05:32

## 2019-07-07 RX ADMIN — DOCUSATE SODIUM 100 MG: 100 CAPSULE, LIQUID FILLED ORAL at 09:01

## 2019-07-07 ASSESSMENT — PAIN DESCRIPTION - DESCRIPTORS
DESCRIPTORS: ACHING
DESCRIPTORS: ACHING

## 2019-07-07 ASSESSMENT — PAIN SCALES - GENERAL
PAINLEVEL_OUTOF10: 0
PAINLEVEL_OUTOF10: 6
PAINLEVEL_OUTOF10: 3
PAINLEVEL_OUTOF10: 8
PAINLEVEL_OUTOF10: 0
PAINLEVEL_OUTOF10: 6
PAINLEVEL_OUTOF10: 8
PAINLEVEL_OUTOF10: 0

## 2019-07-07 ASSESSMENT — PAIN DESCRIPTION - LOCATION
LOCATION: BACK
LOCATION: BACK

## 2019-07-07 ASSESSMENT — PAIN DESCRIPTION - FREQUENCY
FREQUENCY: CONTINUOUS
FREQUENCY: CONTINUOUS

## 2019-07-07 ASSESSMENT — PAIN DESCRIPTION - PAIN TYPE
TYPE: SURGICAL PAIN
TYPE: SURGICAL PAIN

## 2019-07-07 ASSESSMENT — PAIN DESCRIPTION - ORIENTATION
ORIENTATION: LOWER
ORIENTATION: LOWER

## 2019-07-07 ASSESSMENT — PAIN DESCRIPTION - ONSET
ONSET: ON-GOING
ONSET: ON-GOING

## 2019-07-07 NOTE — PROGRESS NOTES
Discharge teaching done with pt ; significant other and family friends. All verbalized understanding. Left floor via wheelchair with transport en route to home.

## 2019-07-08 ENCOUNTER — TELEPHONE (OUTPATIENT)
Dept: PHARMACY | Facility: CLINIC | Age: 54
End: 2019-07-08

## 2019-07-08 DIAGNOSIS — Z98.1 S/P LUMBAR FUSION: Primary | ICD-10-CM

## 2019-07-08 PROCEDURE — 1111F DSCHRG MED/CURRENT MED MERGE: CPT

## 2019-07-08 NOTE — LETTER
[address]  [address], [address]  Phone: [phone]  Fax: [phone] [facility] [address] [facility] [address]           07/09/19     Dear Melida Hinton,    We tried to reach you recently, after your hospital stay, to review your medications. We were unable to reach you on the telephone. We understand that medications can be confusing after a hospital stay. To have a pharmacist review your medications with you, please call [phone] option #7.        Sincerely,     [address]  Phone: [phone], option 7

## 2019-07-08 NOTE — DISCHARGE SUMMARY
[address] [id number]                               DISCHARGE SUMMARY    PATIENT NAME: Dylon Meyers                    :        1965  MED REC NO:   [id number]                           ROOM:       0011  ACCOUNT NO:   [de-identified]                           ADMIT DATE: 2019  PROVIDER:     Sebastián Granados PA-C                    [facility] DATE: 2019      DISCHARGE DIAGNOSES:  1. Lumbar spinal stenosis. 2.  Lumbar spinal scoliosis. 3.  Lumbar radiculopathy bilateral with neurogenic claudication. 4.  Postoperative acute blood loss anemia. OPERATION PERFORMED:  L3-L5 lumbar decompressive laminectomy and fusion. HOSPITAL COURSE:  The patient is 46years of age, presented to our  office having severe low back pain and leg pain that was bilateral,  giving her tremendous difficulty with standing and walking due to the  stenosis at L3-L4 and L4-L5 levels. She elected to further undergo  operative management and underwent surgery at [address] [place] [address]  dated 2019 after which she was admitted to the floor 7 for  continued care and management. On her first day postoperatively, the  patient was noted to have a hemoglobin of 7.2 and feelings of excessive  tiredness. She was given back one unit packed red blood cells which did  improve her hemoglobin up to 8.9. She was able to be up and walking  with physical therapy over the course of 4 days after surgery and had  improved. She was walking well upwards of distances of 100 feet per  time. She was successful with having a bowel movement and overall  improved throughout her stay. She maintained full neurologic integrity  in her lower extremity. Her drain output did decrease adequately and  were able to be removed prior to discharge as well. Dressings were  changed prior to discharge as well. She was discharged to home on the  date of 2019.     DISCHARGE

## 2019-07-08 NOTE — TELEPHONE ENCOUNTER
CLINICAL PHARMACY CONSULTATION: Transition of Care (ANDERSON)  -----------------------------------------------------------------------------------------------  Sandra Ly is a 46 y.o. female  who was discharged from [facility] on 7/7/19 with a diagnosis of lumbar fusion. Attempt made to contact pt. Left msg on home/mobile TAD requesting call back at [phone] or [phone] option 7. Unable to leave message on \"other\" phone. Will continue to attempt to contact pt as appropriate.     Kristy Masterson, PharmD, [place] [facility] Pharmacist  C: [phone]    O: [phone]  Toll free: [phone], option 7    TCM Call Made?: Yes

## 2019-07-09 RX ORDER — POLYETHYLENE GLYCOL 3350 17 G/17G
17 POWDER, FOR SOLUTION ORAL DAILY PRN
COMMUNITY

## 2021-03-20 ENCOUNTER — APPOINTMENT (OUTPATIENT)
Dept: GENERAL RADIOLOGY | Age: 56
End: 2021-03-20
Payer: MEDICARE

## 2021-03-20 ENCOUNTER — HOSPITAL ENCOUNTER (EMERGENCY)
Age: 56
Discharge: HOME | End: 2021-03-20
Attending: EMERGENCY MEDICINE
Payer: MEDICARE

## 2021-03-20 VITALS
RESPIRATION RATE: 20 BRPM | SYSTOLIC BLOOD PRESSURE: 143 MMHG | OXYGEN SATURATION: 99 % | TEMPERATURE: 98.1 F | HEART RATE: 93 BPM | DIASTOLIC BLOOD PRESSURE: 89 MMHG

## 2021-03-20 DIAGNOSIS — S52.532A CLOSED COLLES' FRACTURE OF LEFT RADIUS, INITIAL ENCOUNTER: Primary | ICD-10-CM

## 2021-03-20 PROCEDURE — 99283 EMERGENCY DEPT VISIT LOW MDM: CPT

## 2021-03-20 PROCEDURE — 73110 X-RAY EXAM OF WRIST: CPT

## 2021-03-20 PROCEDURE — 90715 TDAP VACCINE 7 YRS/> IM: CPT | Performed by: EMERGENCY MEDICINE

## 2021-03-20 PROCEDURE — 29125 APPL SHORT ARM SPLINT STATIC: CPT

## 2021-03-20 PROCEDURE — 90471 IMMUNIZATION ADMIN: CPT | Performed by: EMERGENCY MEDICINE

## 2021-03-20 PROCEDURE — 6360000002 HC RX W HCPCS: Performed by: EMERGENCY MEDICINE

## 2021-03-20 PROCEDURE — 6370000000 HC RX 637 (ALT 250 FOR IP): Performed by: EMERGENCY MEDICINE

## 2021-03-20 RX ORDER — HYDROCODONE BITARTRATE AND ACETAMINOPHEN 5; 325 MG/1; MG/1
1 TABLET ORAL ONCE
Status: COMPLETED | OUTPATIENT
Start: 2021-03-20 | End: 2021-03-20

## 2021-03-20 RX ORDER — HYDROCODONE BITARTRATE AND ACETAMINOPHEN 5; 325 MG/1; MG/1
1 TABLET ORAL EVERY 6 HOURS PRN
Qty: 12 TABLET | Refills: 0 | Status: SHIPPED | OUTPATIENT
Start: 2021-03-20 | End: 2021-03-23

## 2021-03-20 RX ADMIN — HYDROCODONE BITARTRATE AND ACETAMINOPHEN 1 TABLET: 5; 325 TABLET ORAL at 18:12

## 2021-03-20 RX ADMIN — TETANUS TOXOID, REDUCED DIPHTHERIA TOXOID AND ACELLULAR PERTUSSIS VACCINE, ADSORBED 0.5 ML: 5; 2.5; 8; 8; 2.5 SUSPENSION INTRAMUSCULAR at 18:13

## 2021-03-20 ASSESSMENT — ENCOUNTER SYMPTOMS: BACK PAIN: 0

## 2021-03-20 ASSESSMENT — PAIN DESCRIPTION - ORIENTATION: ORIENTATION: LEFT

## 2021-03-20 ASSESSMENT — PAIN DESCRIPTION - LOCATION: LOCATION: WRIST

## 2021-03-20 NOTE — ED PROVIDER NOTES
Intravenous, Continuous, Dev Munson MD    sodium chloride flush 0.9 % injection 10 mL, 10 mL, Intravenous, PRN, Dev Munson MD, 10 mL at 14 0905      SOCIAL HISTORY     Social History     Social History Narrative    Not on file     Social History     Tobacco Use    Smoking status: Former Smoker     Packs/day: 0.50     Quit date: 3/19/2014     Years since quittin.0    Smokeless tobacco: Never Used    Tobacco comment: SNEAKS CIG FROM  ON [facility]   Substance Use Topics    Alcohol use: Yes     Comment: O[facility]    Drug use: No         ALLERGIES   No Known Allergies      FAMILY HISTORY     Family History   Problem Relation Age of Onset    Uterine Cancer Mother     Kidney Disease Father     Heart Attack Father          PHYSICAL EXAM     ED Triage Vitals [21 1742]   BP Temp Temp Source Pulse Resp SpO2 Height Weight   (!) 143/89 98.1 °F (36.7 °C) Oral 93 20 99 % -- --         Additional Vital Signs:  Vitals:    212   BP: (!) 143/89   Pulse: 93   Resp: 20   Temp: 98.1 °F (36.7 °C)   SpO2: 99%       Physical Exam  Vitals signs and nursing note reviewed. Constitutional:       Appearance: Normal appearance. HENT:      Head: Normocephalic and atraumatic. Cardiovascular:      Rate and Rhythm: Normal rate and regular rhythm. Pulmonary:      Effort: Pulmonary effort is normal.      Breath sounds: Normal breath sounds. Musculoskeletal:      Left elbow: She exhibits normal range of motion, no swelling and no effusion. Left wrist: She exhibits tenderness, bony tenderness and swelling. She exhibits normal range of motion, no effusion, no deformity and no laceration. Left hand: Normal. She exhibits normal range of motion and no tenderness. Neurological:      Mental Status: She is alert. Initial vital signs and nursing assessment reviewed and abnormal from HTN. Pulsoximetry is normal per my interpretation.     MEDICAL DECISION MAKING   Initial Assessment: Given the patient's above chief complaint and findings on history and physical examination, I thought it was appropriate to consider the following emergency medical conditions:Left wrist frx, sprain vs strain, Head injury, neck injury, occult trauma. Although some of these diagnoses are unlikely they were considered in my medical decision making. Plan: Left wrist X-ray. Symptomatic treatment with Norco, splinting and reassess         ED RESULTS   Laboratory results:  Labs Reviewed - No data to display    Radiologic studies results:  XR WRIST LEFT (MIN 3 VIEWS)   Final Result   There is a fracture through the distal aspect of the left radius. **This report has been created using voice recognition software. It may contain minor errors which are inherent in voice recognition technology. **      Final report electronically signed by Dr Teresa Guevara on 3/20/2021 6:24 PM          ED Medications administered this visit:   Medications   HYDROcodone-acetaminophen (NORCO) 5-325 MG per tablet 1 tablet (1 tablet Oral Given 3/20/21 1812)   Tetanus-Diphth-Acell Pertussis (BOOSTRIX) injection 0.5 mL (0.5 mLs Intramuscular Given 3/20/21 1813)         ED COURSE    Left distal radius fracture. Non-displaced. Splinted and Ortho Referral made. I estimate there is LOW risk for COMPARTMENT SYNDROME, DEEP VENOUS THROMBOSIS, SEPTIC ARTHRITIS, TENDON OR NEUROVASCULAR INJURY, thus I consider the discharge disposition reasonable. The patient and/or family and I have discussed the diagnosis and risks, and we agree with discharging home to follow-up with their primary doctor or the referral orthopedist. We also discussed returning to the Emergency Department immediately if new or worsening symptoms occur. We have discussed the symptoms which are most concerning (e.g., changing or worsening pain, numbness, weakness) that necessitate immediate return.       MEDICATION CHANGES     DISCHARGE MEDICATIONS:  Discharge Medication List as of 3/20/2021  7:10 PM      START taking these medications    Details   HYDROcodone-acetaminophen (NORCO) 5-325 MG per tablet Take 1 tablet by mouth every 6 hours as needed for Pain for up to 3 days. Intended supply: 3 days. Take lowest dose possible to manage pain, Disp-12 tablet, R-0Print                  FINAL DISPOSITION     Final diagnoses:   Closed Colles' fracture of left radius, initial encounter     Condition: condition: good  Dispo: Discharge to home    PATIENT REFERRED TO:  Harvinder Aguilar   [address] [facility] [place] [phone]  Schedule an appointment as soon as possible for a visit on 3/23/2021  At 12:30 PM      Critical Care Time   None    This transcription was electronically signed. Parts of this transcriptions may have been dictated by use of voice recognition software and electronically transcribed, and parts may have been transcribed with the assistance of an ED scribe. The transcription may contain errors not detected in proofreading.     Electronically Signed: Naresh Caal, 03/20/21, 5:54 PM      Naresh Caal DO  03/21/21 124

## 2021-07-21 ENCOUNTER — HOSPITAL ENCOUNTER (OUTPATIENT)
Dept: WOMENS IMAGING | Age: 56
Discharge: HOME | End: 2021-07-21
Payer: MEDICARE

## 2021-07-21 DIAGNOSIS — Z12.31 VISIT FOR SCREENING MAMMOGRAM: ICD-10-CM

## 2021-07-21 PROCEDURE — 77067 SCR MAMMO BI INCL CAD: CPT

## 2022-07-27 ENCOUNTER — HOSPITAL ENCOUNTER (OUTPATIENT)
Dept: WOMENS IMAGING | Age: 57
Discharge: HOME | End: 2022-07-27
Payer: MEDICARE

## 2022-07-27 DIAGNOSIS — Z12.31 ENCOUNTER FOR SCREENING MAMMOGRAM FOR MALIGNANT NEOPLASM OF BREAST: ICD-10-CM

## 2022-07-27 DIAGNOSIS — Z12.31 VISIT FOR SCREENING MAMMOGRAM: ICD-10-CM

## 2022-07-27 PROCEDURE — 77063 BREAST TOMOSYNTHESIS BI: CPT

## 2022-10-24 ENCOUNTER — HOSPITAL ENCOUNTER (OUTPATIENT)
Age: 57
Discharge: HOME | End: 2022-10-24
Payer: COMMERCIAL

## 2022-10-24 ENCOUNTER — HOSPITAL ENCOUNTER (OUTPATIENT)
Dept: GENERAL RADIOLOGY | Age: 57
Discharge: HOME | End: 2022-10-24
Payer: COMMERCIAL

## 2022-10-24 DIAGNOSIS — Z02.71 DISABILITY EXAMINATION: ICD-10-CM

## 2022-10-24 PROCEDURE — 72100 X-RAY EXAM L-S SPINE 2/3 VWS: CPT

## 2023-09-18 ENCOUNTER — HOSPITAL ENCOUNTER (OUTPATIENT)
Dept: MAMMOGRAPHY | Age: 58
Discharge: HOME | End: 2023-09-18
Payer: MEDICARE

## 2023-09-18 VITALS — WEIGHT: 100 LBS | HEIGHT: 63 IN | BODY MASS INDEX: 17.72 KG/M2

## 2023-09-18 DIAGNOSIS — Z12.31 VISIT FOR SCREENING MAMMOGRAM: ICD-10-CM

## 2023-09-18 PROCEDURE — 77063 BREAST TOMOSYNTHESIS BI: CPT

## 2023-11-13 ENCOUNTER — HOSPITAL ENCOUNTER (OUTPATIENT)
Dept: GENERAL RADIOLOGY | Age: 58
Discharge: HOME | End: 2023-11-13
Payer: MEDICARE

## 2023-11-13 ENCOUNTER — HOSPITAL ENCOUNTER (OUTPATIENT)
Age: 58
Discharge: HOME | End: 2023-11-13
Payer: MEDICARE

## 2023-11-13 DIAGNOSIS — M46.1 SACROILIITIS (HCC): ICD-10-CM

## 2023-11-13 PROCEDURE — 72100 X-RAY EXAM L-S SPINE 2/3 VWS: CPT

## 2023-11-13 PROCEDURE — 73502 X-RAY EXAM HIP UNI 2-3 VIEWS: CPT

## 2025-07-10 ENCOUNTER — LAB (OUTPATIENT)
Dept: LAB | Age: 60
End: 2025-07-10

## 2025-07-10 LAB
ALBUMIN SERPL BCG-MCNC: 3.9 G/DL (ref 3.4–4.9)
ALP SERPL-CCNC: 156 U/L (ref 38–126)
ALT SERPL W/O P-5'-P-CCNC: 26 U/L (ref 10–35)
AST SERPL-CCNC: 26 U/L (ref 10–35)
BILIRUB CONJ SERPL-MCNC: < 0.1 MG/DL (ref 0–0.2)
BILIRUB SERPL-MCNC: 0.2 MG/DL (ref 0.3–1.2)
PROT SERPL-MCNC: 6.6 G/DL (ref 6.4–8.3)

## 2025-07-11 LAB — A1AT SERPL-MCNC: 180 MG/DL (ref 90–200)

## (undated) DEVICE — JCKSON TBL POSTNER NO HD REST: Brand: MEDLINE INDUSTRIES, INC.

## (undated) DEVICE — BIPOLAR SEALER 23-112-1 AQM 6.0: Brand: AQUAMANTYS ®

## (undated) DEVICE — SET AUTOTRNS C175ML BOWL BTM OUTLT RESERVOIRXTRA

## (undated) DEVICE — KIT EVAC 400CC PVC RADPQ Y CONN

## (undated) DEVICE — COVER,LIGHT HANDLE,FLX,2/PK: Brand: MEDLINE INDUSTRIES, INC.

## (undated) DEVICE — DRAIN SURG 10FR PVC TB W/ TRCR MID PERF NO RESVR HUBLESS

## (undated) DEVICE — SUTURE NONABSORBABLE MONOFILAMENT 5-0 C-1 1X24 IN PROLENE 8725H

## (undated) DEVICE — BASIC SINGLE BASIN BTC-LF: Brand: MEDLINE INDUSTRIES, INC.

## (undated) DEVICE — KAIRISON TUBING SET PNEUMATIC, (3000 MM), STERILE, DISPOSABLE, TO BE USED WITH: FK898R, PACKAGE OF 10 PIECES: Brand: KAIRISON

## (undated) DEVICE — COVER ARMBRD W13XL28.5IN IMPERV BLU FOR OP RM

## (undated) DEVICE — GLOVE ORANGE PI 8   MSG9080

## (undated) DEVICE — 450 ML BOTTLE OF 0.05% CHLORHEXIDINE GLUCONATE IN 99.95% STERILE WATER FOR IRRIGATION, USP AND APPLICATOR.: Brand: IRRISEPT ANTIMICROBIAL WOUND LAVAGE

## (undated) DEVICE — 4-PORT MANIFOLD: Brand: NEPTUNE 2

## (undated) DEVICE — THE MILL DISPOSABLE - MEDIUM

## (undated) DEVICE — GOWN,SIRUS,NON REINFRCD,LARGE,SET IN SL: Brand: MEDLINE

## (undated) DEVICE — GOWN,SIRUS,NONRNF,SETINSLV,XL,20/CS: Brand: MEDLINE

## (undated) DEVICE — SUTURE ETHLN SZ 2-0 L30IN NONABSORBABLE BLK L36MM FSLX 3/8 1674H

## (undated) DEVICE — GLOVE ORANGE PI 8 1/2   MSG9085

## (undated) DEVICE — SOLUTION IV IRRIG WATER 1000ML POUR BRL 2F7114

## (undated) DEVICE — 3M™ BAIR HUGGER® MULTI ACCESS BLANKET, PEDIATRIC, FULL BODY, 10 PER CASE 31000: Brand: BAIR HUGGER™

## (undated) DEVICE — SUTURE NRLN SZ 4-0 L18IN NONABSORBABLE BLK L13MM TF 1/2 CIR C584D

## (undated) DEVICE — SUTURE VCRL SZ 2-0 L27IN ABSRB UD L36MM CP-1 1/2 CIR REV J266H

## (undated) DEVICE — DRESSING,GAUZE,XEROFORM,CURAD,5"X9",ST: Brand: CURAD

## (undated) DEVICE — CODMAN® SURGICAL PATTIES 1" X 1" (2.54CM X 2.54CM): Brand: CODMAN®

## (undated) DEVICE — PATIENT RETURN ELECTRODE, SINGLE-USE, CONTACT QUALITY MONITORING, ADULT, WITH 9FT CORD, FOR PATIENTS WEIGING OVER 33LBS. (15KG): Brand: MEGADYNE

## (undated) DEVICE — PROBE STIM 3 MM FOR PEDCL SCREW DISP

## (undated) DEVICE — SOLUTION IV 1000ML 0.9% SOD CHL PH 5 INJ USP VIAFLX PLAS

## (undated) DEVICE — GLOVE ORANGE PI 7   MSG9070

## (undated) DEVICE — LINE ASPIR 1/4 ANTICOAG

## (undated) DEVICE — SUTURE ABSRB BRAID COAT UD CP NO 2 27IN VCRL J195H

## (undated) DEVICE — GLOVE SURG SZ 9 THK91MIL LTX FREE SYN POLYISOPRENE ANTI

## (undated) DEVICE — GLOVE SURG SZ 65 THK91MIL LTX FREE SYN POLYISOPRENE

## (undated) DEVICE — SUTURE NRLN SZ 4-0 L18IN NONABSORBABLE BLK L17MM RB-1 1/2 C554D

## (undated) DEVICE — TOTAL TRAY, DB, 100% SILI FOLEY, 16FR 10: Brand: MEDLINE

## (undated) DEVICE — PAD,O.R.,TABLE,CONV FOAM,2X20X72: Brand: MEDLINE

## (undated) DEVICE — 1010 S-DRAPE TOWEL DRAPE 10/BX: Brand: STERI-DRAPE™

## (undated) DEVICE — SUTURE ETHLN SZ 3-0 L18IN NONABSORBABLE BLK FS-1 L24MM 3/8 663H

## (undated) DEVICE — BUR RND FLUT AGRSV 5MM

## (undated) DEVICE — Device

## (undated) DEVICE — E-Z CLEAN, NON-STICK, PTFE COATED, ELECTROSURGICAL BLADE ELECTRODE, 6.5 INCH (16.5 CM): Brand: MEGADYNE